# Patient Record
Sex: MALE | Race: WHITE | NOT HISPANIC OR LATINO
[De-identification: names, ages, dates, MRNs, and addresses within clinical notes are randomized per-mention and may not be internally consistent; named-entity substitution may affect disease eponyms.]

---

## 2019-06-14 PROBLEM — Z00.00 ENCOUNTER FOR PREVENTIVE HEALTH EXAMINATION: Status: ACTIVE | Noted: 2019-06-14

## 2019-06-19 ENCOUNTER — APPOINTMENT (OUTPATIENT)
Dept: OTOLARYNGOLOGY | Facility: CLINIC | Age: 19
End: 2019-06-19
Payer: COMMERCIAL

## 2019-06-19 PROCEDURE — 99213 OFFICE O/P EST LOW 20 MIN: CPT | Mod: 25

## 2019-06-19 PROCEDURE — 31231 NASAL ENDOSCOPY DX: CPT

## 2019-06-19 NOTE — HISTORY OF PRESENT ILLNESS
[de-identified] : Patient well known to me. Seeing December 2017 after football trauma to the left side of the nose. Had a deviation of the dorsum to the right and mild nasal congestion. Premorbid photographs revealed dorsum also deviated to the right and a kick count left nostril. Had severe septal deviation to the left thought to be pre-existing. He underwent a closed reduction with excellent results. Seen back in the office August 2018 after a second nasal trauma from sports. Noted to have a more severe deformity also pushed off to the right. Underwent a second close reduction. Had nasal obstruction at the time which we were monitoring for possible septoplasty. Incidental purulence at the middle meatus also noted. Patient has been living in [place] recently. Find his nasal airway is increasingly difficult to tolerate because the airway is obstructed. No response to sprays. Presents today with his mother interested in possible surgical options

## 2019-06-19 NOTE — ASSESSMENT
[FreeTextEntry1] : Had a discussion with the patient and his mom about septoplasty and turbinate reduction.  The risks, benefits, and alternatives of revision septoplasty, turbinate reduction were explained to the patient at length and all questions were answered. Discussed the risks as including but not limited to bleeding, infection, need for further surgery, scarring, septal perforation. We discussed postoperative care in detail including sleeping with head of bed elevation, no nose blowing, cleaning the nostrils and stitches with peroxide on a Q-tip then coating bacitracin.  He will avoid aspirin, ibuprofen, and supplement products for one week prior and after surgery.\par \par They may also be interested in other options

## 2021-03-22 ENCOUNTER — APPOINTMENT (OUTPATIENT)
Dept: OTOLARYNGOLOGY | Facility: CLINIC | Age: 21
End: 2021-03-22
Payer: COMMERCIAL

## 2021-03-22 VITALS — WEIGHT: 142 LBS | BODY MASS INDEX: 19.88 KG/M2 | TEMPERATURE: 98.7 F | HEIGHT: 71 IN

## 2021-03-22 DIAGNOSIS — M95.0 ACQUIRED DEFORMITY OF NOSE: ICD-10-CM

## 2021-03-22 PROCEDURE — 99214 OFFICE O/P EST MOD 30 MIN: CPT

## 2021-03-22 PROCEDURE — 99072 ADDL SUPL MATRL&STAF TM PHE: CPT

## 2021-03-22 RX ORDER — KETOCONAZOLE 20 MG/G
2 CREAM TOPICAL
Qty: 30 | Refills: 0 | Status: DISCONTINUED | COMMUNITY
Start: 2021-02-17

## 2021-03-23 PROBLEM — M95.0 ACQUIRED DEFORMITY OF NOSE: Status: ACTIVE | Noted: 2019-06-19

## 2021-03-23 NOTE — HISTORY OF PRESENT ILLNESS
[de-identified] : This is the initial visit for this gentleman-however he is known to the practice.\par \par He is a history of nasal trauma with closed nasal reduction.\par In the past he considered septoplasty and turbinate reduction. He did not proceed with that surgery.\par \par At this point he reports a poor nasal airway bilaterally. His nasal airway is worse on the left than the right.\par He does not have a history of sinus problems.

## 2021-03-23 NOTE — ASSESSMENT
[FreeTextEntry1] : He has primarily structural nasal pathology causing nasal airway obstruction.\par He put his mother on speaker phone and I explained to him that his options were to continue with observation.\par He was also an ideal candidate for septoplasty and inferior turbinate submucous resection.\par Which were discussed which included but not limited to the risks of general anesthesia, bleeding, infection as well as septal perforation.\par I also spent a great deal of time talking to her about variable outcomes from this sort of surgery.\par \par His mother also wanted to know if I could "take care the bump" in his nose.\par I explained to him and his mother that is a separate procedure.\par He said he would think about it his options.\par He knows he would require pictures and further workup before proceeding with that portion of the operation if he wanted to.\par \par I spent greater than 30 minutes on the care of this patient today.

## 2021-03-23 NOTE — PROCEDURE
[FreeTextEntry6] : PROCEDURE NOTES\par \par PROCEDURE: Nasal endoscopy \par SURGEON: Dr. Phipps\par INDICATIONS: Assess for chronic sinusitis. \par ANESTHESIA: The patient was placed in a sitting position.  Following application of the topical anesthetic and decongestant, exam was performed with a zero degree endoscope.  The scope was passed along the right nasal floor to the nasopharynx.  It was then passed into the region of the middle meatus, middle turbinate, and sphenoethmoid region.  An identical procedure was performed on the left side.  The following findings were noted:\par \par The nasal mucosa was healthy appearing and the septum Is significantly deviated to the left contacting the left lateral nasal wall. This was still present afteraggressive decongestion. He has a right sided maxillary crest spur. He has bilateral inferior turbinate hypertrophy.. The middle meatus and sphenoethmoid recesses were clear bilaterally. The nasopharynx was normal. \par

## 2021-09-13 ENCOUNTER — APPOINTMENT (OUTPATIENT)
Dept: OTOLARYNGOLOGY | Facility: CLINIC | Age: 21
End: 2021-09-13
Payer: COMMERCIAL

## 2021-09-13 VITALS — TEMPERATURE: 97.2 F | WEIGHT: 143 LBS | HEIGHT: 72 IN | BODY MASS INDEX: 19.37 KG/M2

## 2021-09-13 DIAGNOSIS — J34.3 HYPERTROPHY OF NASAL TURBINATES: ICD-10-CM

## 2021-09-13 DIAGNOSIS — J34.2 DEVIATED NASAL SEPTUM: ICD-10-CM

## 2021-09-13 PROCEDURE — 99213 OFFICE O/P EST LOW 20 MIN: CPT | Mod: 25

## 2021-09-13 PROCEDURE — 31231 NASAL ENDOSCOPY DX: CPT

## 2021-09-13 RX ORDER — CETIRIZINE HCL 10 MG
TABLET ORAL
Refills: 0 | Status: ACTIVE | COMMUNITY

## 2021-09-13 RX ORDER — FLUTICASONE PROPIONATE 50 UG/1
50 SPRAY, METERED NASAL
Qty: 16 | Refills: 0 | Status: DISCONTINUED | COMMUNITY
Start: 2020-12-14 | End: 2021-09-13

## 2021-09-14 PROBLEM — J34.3 NASAL TURBINATE HYPERTROPHY: Status: ACTIVE | Noted: 2021-09-14

## 2021-09-14 PROBLEM — J34.2 DEVIATED SEPTUM: Status: ACTIVE | Noted: 2019-06-19

## 2021-09-14 NOTE — ASSESSMENT
[FreeTextEntry1] : He is a candidate for septoplasty and inferior turbinates bilateral submucous resection. As he failed several months of topical and systemic therapy.\par Talked about the risks which included but were not limited to risks of anesthesia, bleeding, infection, septal perforation.\par We'll see talked about. We'll outcomes of surgery.\par We'll see discussed postoperative care.

## 2021-09-14 NOTE — PROCEDURE
[FreeTextEntry6] : PROCEDURE NOTES\par \par PROCEDURE: Nasal endoscopy \par SURGEON: Dr. Phipps\par INDICATIONS: Assess for chronic sinusitis. \par ANESTHESIA: The patient was placed in a sitting position.  Following application of the topical anesthetic and decongestant, exam was performed with a zero degree endoscope.  The scope was passed along the right nasal floor to the nasopharynx.  It was then passed into the region of the middle meatus, middle turbinate, and sphenoethmoid region.  An identical procedure was performed on the left side.  The following findings were noted:\par \par The nasal mucosa was healthy appearing and the septum Caudal septal deflection to the left. Maxillary crest spur. Bilateral inferior turbinate hypertrophy. The middle meatus and sphenoethmoid recesses were clear bilaterally. The nasopharynx was normal. \par

## 2021-09-14 NOTE — HISTORY OF PRESENT ILLNESS
[de-identified] : This is the initial visit for this gentleman-however he is known to the practice.\par \par He is a history of nasal trauma with closed nasal reduction.\par In the past he considered septoplasty and turbinate reduction. He did not proceed with that surgery.\par \par At this point he reports a poor nasal airway bilaterally. His nasal airway is worse on the left than the right.\par He does not have a history of sinus problems. [FreeTextEntry1] : 09/13/2021 \par \par He follows up today.\par He continues to complain of  poor nasal airway bilaterally worse on the left than the right. This developed after nasal trauma. 2 attempts a closed nasal reduction was performed at the time.

## 2021-10-05 ENCOUNTER — TRANSCRIPTION ENCOUNTER (OUTPATIENT)
Age: 21
End: 2021-10-05

## 2021-10-06 ENCOUNTER — OUTPATIENT (OUTPATIENT)
Dept: OUTPATIENT SERVICES | Facility: HOSPITAL | Age: 21
LOS: 1 days | Discharge: HOME | End: 2021-10-06
Payer: COMMERCIAL

## 2021-10-06 ENCOUNTER — APPOINTMENT (OUTPATIENT)
Dept: OTOLARYNGOLOGY | Facility: AMBULATORY SURGERY CENTER | Age: 21
End: 2021-10-06

## 2021-10-06 PROCEDURE — 30520 REPAIR OF NASAL SEPTUM: CPT

## 2021-10-06 PROCEDURE — 30140 RESECT INFERIOR TURBINATE: CPT | Mod: 50

## 2021-10-14 ENCOUNTER — APPOINTMENT (OUTPATIENT)
Dept: OTOLARYNGOLOGY | Facility: CLINIC | Age: 21
End: 2021-10-14
Payer: COMMERCIAL

## 2021-10-14 VITALS — HEIGHT: 72 IN | BODY MASS INDEX: 19.37 KG/M2 | WEIGHT: 143 LBS

## 2021-10-14 DIAGNOSIS — Z98.890 OTHER SPECIFIED POSTPROCEDURAL STATES: ICD-10-CM

## 2021-10-14 PROCEDURE — 99024 POSTOP FOLLOW-UP VISIT: CPT

## 2021-10-15 PROBLEM — Z98.890 S/P NASAL SEPTOPLASTY: Status: ACTIVE | Noted: 2021-10-15

## 2021-10-15 NOTE — REASON FOR VISIT
[de-identified] : 7 [de-identified] : Now 8 days status post septoplasty and turbinate reduction.\par He does not report any pain or bleeding.\par PROCEDURE NOTES\par \par PROCEDURE: Nasal endoscopy \par SURGEON: Dr. Phipps\par INDICATIONS: Assess for chronic sinusitis. \par ANESTHESIA: The patient was placed in a sitting position.  Following application of the topical anesthetic and decongestant, exam was performed with a zero degree endoscope.  The scope was passed along the right nasal floor to the nasopharynx.  It was then passed into the region of the middle meatus, middle turbinate, and sphenoethmoid region.  An identical procedure was performed on the left side.  The following findings were noted:\par \par The nasal mucosa was healthy appearing and the septum was roughly midline. The middle meatus and sphenoethmoid recesses were clear bilaterally. The nasopharynx was normal. \par  Clinically looks quite well.\par He will use twice daily hypertonic saline irrigations and followup with me in 3-4 weeks time or as needed